# Patient Record
Sex: MALE | Race: WHITE
[De-identification: names, ages, dates, MRNs, and addresses within clinical notes are randomized per-mention and may not be internally consistent; named-entity substitution may affect disease eponyms.]

---

## 2018-09-27 ENCOUNTER — HOSPITAL ENCOUNTER (INPATIENT)
Dept: HOSPITAL 91 - E/R | Age: 68
LOS: 2 days | Discharge: HOME | DRG: 281 | End: 2018-09-29
Payer: MEDICARE

## 2018-09-27 ENCOUNTER — HOSPITAL ENCOUNTER (INPATIENT)
Age: 68
LOS: 2 days | Discharge: HOME | DRG: 281 | End: 2018-09-29

## 2018-09-27 DIAGNOSIS — F41.9: ICD-10-CM

## 2018-09-27 DIAGNOSIS — D69.6: ICD-10-CM

## 2018-09-27 DIAGNOSIS — M19.90: ICD-10-CM

## 2018-09-27 DIAGNOSIS — Z95.5: ICD-10-CM

## 2018-09-27 DIAGNOSIS — K59.09: ICD-10-CM

## 2018-09-27 DIAGNOSIS — K21.9: ICD-10-CM

## 2018-09-27 DIAGNOSIS — E11.9: ICD-10-CM

## 2018-09-27 DIAGNOSIS — I50.30: ICD-10-CM

## 2018-09-27 DIAGNOSIS — Z82.49: ICD-10-CM

## 2018-09-27 DIAGNOSIS — F32.9: ICD-10-CM

## 2018-09-27 DIAGNOSIS — E78.1: ICD-10-CM

## 2018-09-27 DIAGNOSIS — Z88.2: ICD-10-CM

## 2018-09-27 DIAGNOSIS — I48.0: ICD-10-CM

## 2018-09-27 DIAGNOSIS — I21.A1: Primary | ICD-10-CM

## 2018-09-27 DIAGNOSIS — I25.10: ICD-10-CM

## 2018-09-27 DIAGNOSIS — R65.10: ICD-10-CM

## 2018-09-27 LAB
ABNORMAL IP MESSAGE: 1
ADD MAN DIFF?: NO
ADD MAN DIFF?: NO
ANION GAP: 18 (ref 8–16)
B-TYPE NATRIURETIC PEPTIDE: 66 PG/ML (ref 0–125)
BASOPHIL #: 0 10^3/UL (ref 0–0.1)
BASOPHIL #: 0.1 10^3/UL (ref 0–0.1)
BASOPHILS %: 0.4 % (ref 0–2)
BASOPHILS %: 0.5 % (ref 0–2)
BLOOD UREA NITROGEN: 17 MG/DL (ref 7–20)
CALCIUM: 9.9 MG/DL (ref 8.4–10.2)
CARBON DIOXIDE: 19 MMOL/L (ref 21–31)
CHLORIDE: 104 MMOL/L (ref 97–110)
CK INDEX: 5.4
CK INDEX: 5.4
CK-MB: 2.49 NG/ML (ref 0–2.4)
CK-MB: 2.64 NG/ML (ref 0–2.4)
CREATINE KINASE: 46 IU/L (ref 23–200)
CREATINE KINASE: 49 IU/L (ref 23–200)
CREATININE: 0.82 MG/DL (ref 0.61–1.24)
EOSINOPHILS #: 0.1 10^3/UL (ref 0–0.5)
EOSINOPHILS #: 0.1 10^3/UL (ref 0–0.5)
EOSINOPHILS %: 0.7 % (ref 0–7)
EOSINOPHILS %: 0.8 % (ref 0–7)
GLUCOSE: 194 MG/DL (ref 70–220)
HEMATOCRIT: 37.3 % (ref 42–52)
HEMATOCRIT: 41.9 % (ref 42–52)
HEMOGLOBIN: 12.9 G/DL (ref 14–18)
HEMOGLOBIN: 15 G/DL (ref 14–18)
IMMATURE GRANS #M: 0.1 10^3/UL (ref 0–0.03)
IMMATURE GRANS #M: 0.2 10^3/UL (ref 0–0.03)
IMMATURE GRANS % (M): 1.2 % (ref 0–0.43)
IMMATURE GRANS % (M): 1.3 % (ref 0–0.43)
INR: 1.03
LYMPHOCYTES #: 2.5 10^3/UL (ref 0.8–2.9)
LYMPHOCYTES #: 6.4 10^3/UL (ref 0.8–2.9)
LYMPHOCYTES %: 29 % (ref 15–51)
LYMPHOCYTES %: 42.4 % (ref 15–51)
MEAN CORPUSCULAR HEMOGLOBIN: 30.4 PG (ref 29–33)
MEAN CORPUSCULAR HEMOGLOBIN: 30.6 PG (ref 29–33)
MEAN CORPUSCULAR HGB CONC: 34.6 G/DL (ref 32–37)
MEAN CORPUSCULAR HGB CONC: 35.8 G/DL (ref 32–37)
MEAN CORPUSCULAR VOLUME: 84.8 FL (ref 82–101)
MEAN CORPUSCULAR VOLUME: 88.4 FL (ref 82–101)
MEAN PLATELET VOLUME: 10.6 FL (ref 7.4–10.4)
MEAN PLATELET VOLUME: 11.5 FL (ref 7.4–10.4)
MONOCYTE #: 0.7 10^3/UL (ref 0.3–0.9)
MONOCYTE #: 1.2 10^3/UL (ref 0.3–0.9)
MONOCYTES %: 7.9 % (ref 0–11)
MONOCYTES %: 8.2 % (ref 0–11)
NEUTROPHIL #: 5.2 10^3/UL (ref 1.6–7.5)
NEUTROPHIL #: 7.1 10^3/UL (ref 1.6–7.5)
NEUTROPHILS %: 47.3 % (ref 39–77)
NEUTROPHILS %: 60.3 % (ref 39–77)
NUCLEATED RED BLOOD CELLS #: 0 10^3/UL (ref 0–0)
NUCLEATED RED BLOOD CELLS #: 0 10^3/UL (ref 0–0)
NUCLEATED RED BLOOD CELLS%: 0 /100WBC (ref 0–0)
NUCLEATED RED BLOOD CELLS%: 0 /100WBC (ref 0–0)
PARTIAL THROMBOPLASTIN TIME: 23.4 SEC (ref 23–35)
PLATELET COUNT: 117 10^3/UL (ref 140–415)
PLATELET COUNT: 130 10^3/UL (ref 140–415)
POSITIVE DIFF: (no result)
POTASSIUM: 4.1 MMOL/L (ref 3.5–5.1)
PROTIME: 13.6 SEC (ref 11.9–14.9)
PT RATIO: 1.1
RED BLOOD COUNT: 4.22 10^6/UL (ref 4.7–6.1)
RED BLOOD COUNT: 4.94 10^6/UL (ref 4.7–6.1)
RED CELL DISTRIBUTION WIDTH: 12.5 % (ref 11.5–14.5)
RED CELL DISTRIBUTION WIDTH: 12.6 % (ref 11.5–14.5)
SODIUM: 137 MMOL/L (ref 135–144)
TROPONIN-I: 0.02 NG/ML (ref 0–0.12)
TROPONIN-I: 0.23 NG/ML (ref 0–0.12)
TROPONIN-I: 0.45 NG/ML (ref 0–0.12)
WHITE BLOOD COUNT: 15 10^3/UL (ref 4.8–10.8)
WHITE BLOOD COUNT: 8.6 10^3/UL (ref 4.8–10.8)

## 2018-09-27 PROCEDURE — 80053 COMPREHEN METABOLIC PANEL: CPT

## 2018-09-27 PROCEDURE — 81001 URINALYSIS AUTO W/SCOPE: CPT

## 2018-09-27 PROCEDURE — 93458 L HRT ARTERY/VENTRICLE ANGIO: CPT

## 2018-09-27 PROCEDURE — 83735 ASSAY OF MAGNESIUM: CPT

## 2018-09-27 PROCEDURE — 80048 BASIC METABOLIC PNL TOTAL CA: CPT

## 2018-09-27 PROCEDURE — 80061 LIPID PANEL: CPT

## 2018-09-27 PROCEDURE — 80164 ASSAY DIPROPYLACETIC ACD TOT: CPT

## 2018-09-27 PROCEDURE — 82553 CREATINE MB FRACTION: CPT

## 2018-09-27 PROCEDURE — 99285 EMERGENCY DEPT VISIT HI MDM: CPT

## 2018-09-27 PROCEDURE — 85610 PROTHROMBIN TIME: CPT

## 2018-09-27 PROCEDURE — 71045 X-RAY EXAM CHEST 1 VIEW: CPT

## 2018-09-27 PROCEDURE — 84443 ASSAY THYROID STIM HORMONE: CPT

## 2018-09-27 PROCEDURE — 96375 TX/PRO/DX INJ NEW DRUG ADDON: CPT

## 2018-09-27 PROCEDURE — 84484 ASSAY OF TROPONIN QUANT: CPT

## 2018-09-27 PROCEDURE — 82550 ASSAY OF CK (CPK): CPT

## 2018-09-27 PROCEDURE — 82962 GLUCOSE BLOOD TEST: CPT

## 2018-09-27 PROCEDURE — 83880 ASSAY OF NATRIURETIC PEPTIDE: CPT

## 2018-09-27 PROCEDURE — 85025 COMPLETE CBC W/AUTO DIFF WBC: CPT

## 2018-09-27 PROCEDURE — 93005 ELECTROCARDIOGRAM TRACING: CPT

## 2018-09-27 PROCEDURE — 96374 THER/PROPH/DIAG INJ IV PUSH: CPT

## 2018-09-27 PROCEDURE — 36415 COLL VENOUS BLD VENIPUNCTURE: CPT

## 2018-09-27 PROCEDURE — 85730 THROMBOPLASTIN TIME PARTIAL: CPT

## 2018-09-27 PROCEDURE — 83036 HEMOGLOBIN GLYCOSYLATED A1C: CPT

## 2018-09-27 PROCEDURE — 93306 TTE W/DOPPLER COMPLETE: CPT

## 2018-09-27 RX ADMIN — DILTIAZEM HYDROCHLORIDE 1 MG: 5 INJECTION INTRAVENOUS at 05:01

## 2018-09-27 RX ADMIN — ASPIRIN 81 MG CHEWABLE TABLET 1 MG: 81 TABLET CHEWABLE at 09:31

## 2018-09-27 RX ADMIN — ENOXAPARIN SODIUM 1 MG: 100 INJECTION SUBCUTANEOUS at 09:36

## 2018-09-27 RX ADMIN — DILTIAZEM HCL 125 MG/125ML IN DEXTROSE 5% IV SOLN (1 MG/ML) 1 MLS/HR: 125-5/125 SOLUTION at 05:50

## 2018-09-27 RX ADMIN — MORPHINE SULFATE 1 MG: 2 INJECTION, SOLUTION INTRAMUSCULAR; INTRAVENOUS at 20:42

## 2018-09-27 RX ADMIN — INSULIN DETEMIR 1 UNITS: 100 INJECTION, SOLUTION SUBCUTANEOUS at 21:11

## 2018-09-27 RX ADMIN — INSULIN ASPART 1 UNIT: 100 INJECTION, SOLUTION INTRAVENOUS; SUBCUTANEOUS at 09:41

## 2018-09-27 RX ADMIN — INSULIN ASPART 1 UNIT: 100 INJECTION, SOLUTION INTRAVENOUS; SUBCUTANEOUS at 17:52

## 2018-09-27 RX ADMIN — ASPIRIN 81 MG CHEWABLE TABLET 1 MG: 81 TABLET CHEWABLE at 05:00

## 2018-09-27 RX ADMIN — KETOROLAC TROMETHAMINE 1 MG: 30 INJECTION, SOLUTION INTRAMUSCULAR at 05:50

## 2018-09-27 RX ADMIN — INSULIN ASPART 1 UNIT: 100 INJECTION, SOLUTION INTRAVENOUS; SUBCUTANEOUS at 21:11

## 2018-09-27 RX ADMIN — METOPROLOL TARTRATE 1 MG: 5 INJECTION, SOLUTION INTRAVENOUS at 05:50

## 2018-09-27 RX ADMIN — INSULIN ASPART 1 UNIT: 100 INJECTION, SOLUTION INTRAVENOUS; SUBCUTANEOUS at 12:20

## 2018-09-27 RX ADMIN — ENOXAPARIN SODIUM 1 MG: 100 INJECTION SUBCUTANEOUS at 21:11

## 2018-09-27 RX ADMIN — ACETAMINOPHEN 1 MG: 325 TABLET, FILM COATED ORAL at 14:52

## 2018-09-27 RX ADMIN — NITROGLYCERIN 1 TAB: 0.4 TABLET, ORALLY DISINTEGRATING SUBLINGUAL at 20:24

## 2018-09-27 RX ADMIN — ENOXAPARIN SODIUM 1 MG: 100 INJECTION SUBCUTANEOUS at 15:35

## 2018-09-28 LAB
ADD MAN DIFF?: NO
ALANINE AMINOTRANSFERASE: 31 IU/L (ref 13–69)
ALBUMIN/GLOBULIN RATIO: 1.24
ALBUMIN: 3.1 G/DL (ref 3.3–4.9)
ALKALINE PHOSPHATASE: 37 IU/L (ref 42–121)
ANION GAP: 11 (ref 8–16)
ASPARTATE AMINO TRANSFERASE: 12 IU/L (ref 15–46)
BASOPHIL #: 0 10^3/UL (ref 0–0.1)
BASOPHILS %: 0.4 % (ref 0–2)
BILIRUBIN,DIRECT: 0 MG/DL (ref 0–0.2)
BILIRUBIN,TOTAL: 0.5 MG/DL (ref 0.2–1.3)
BLOOD UREA NITROGEN: 19 MG/DL (ref 7–20)
CALCIUM: 9.7 MG/DL (ref 8.4–10.2)
CARBON DIOXIDE: 27 MMOL/L (ref 21–31)
CHLORIDE: 104 MMOL/L (ref 97–110)
CHOL/HDL RATIO: 3.1 RATIO
CHOLESTEROL: 106 MG/DL (ref 100–200)
CK INDEX: 3.8
CK-MB: 0.94 NG/ML (ref 0–2.4)
CREATINE KINASE: 25 IU/L (ref 23–200)
CREATININE: 0.58 MG/DL (ref 0.61–1.24)
EOSINOPHILS #: 0.1 10^3/UL (ref 0–0.5)
EOSINOPHILS %: 1.1 % (ref 0–7)
GLOBULIN: 2.5 G/DL (ref 1.3–3.2)
GLUCOSE: 157 MG/DL (ref 70–220)
HDL CHOLESTEROL: 34 MG/DL (ref 30–78)
HEMATOCRIT: 39.4 % (ref 42–52)
HEMOGLOBIN A1C: 9.2 % (ref 0–5.9)
HEMOGLOBIN: 13.6 G/DL (ref 14–18)
IMMATURE GRANS #M: 0.05 10^3/UL (ref 0–0.03)
IMMATURE GRANS % (M): 0.6 % (ref 0–0.43)
LDL CHOLESTEROL,CALCULATED: 12 MG/DL
LYMPHOCYTES #: 2.2 10^3/UL (ref 0.8–2.9)
LYMPHOCYTES %: 27.9 % (ref 15–51)
MAGNESIUM: 1.7 MG/DL (ref 1.7–2.5)
MEAN CORPUSCULAR HEMOGLOBIN: 30.4 PG (ref 29–33)
MEAN CORPUSCULAR HGB CONC: 34.5 G/DL (ref 32–37)
MEAN CORPUSCULAR VOLUME: 88.1 FL (ref 82–101)
MEAN PLATELET VOLUME: 10.1 FL (ref 7.4–10.4)
MONOCYTE #: 0.8 10^3/UL (ref 0.3–0.9)
MONOCYTES %: 9.6 % (ref 0–11)
NEUTROPHIL #: 4.7 10^3/UL (ref 1.6–7.5)
NEUTROPHILS %: 60.4 % (ref 39–77)
NUCLEATED RED BLOOD CELLS #: 0 10^3/UL (ref 0–0)
NUCLEATED RED BLOOD CELLS%: 0 /100WBC (ref 0–0)
PLATELET COUNT: 133 10^3/UL (ref 140–415)
POTASSIUM: 3.8 MMOL/L (ref 3.5–5.1)
RED BLOOD COUNT: 4.47 10^6/UL (ref 4.7–6.1)
RED CELL DISTRIBUTION WIDTH: 13.1 % (ref 11.5–14.5)
SODIUM: 138 MMOL/L (ref 135–144)
THYROID STIMULATING HORMONE: 4 MIU/L (ref 0.47–4.68)
TOTAL PROTEIN: 5.6 G/DL (ref 6.1–8.1)
TRIGLYCERIDES: 301 MG/DL (ref 0–149)
TROPONIN-I: 0.33 NG/ML (ref 0–0.12)
TROPONIN-I: 0.35 NG/ML (ref 0–0.12)
VALPROATE: 10 UG/ML (ref 50–100)
WHITE BLOOD COUNT: 7.9 10^3/UL (ref 4.8–10.8)

## 2018-09-28 PROCEDURE — B215YZZ FLUOROSCOPY OF LEFT HEART USING OTHER CONTRAST: ICD-10-PCS

## 2018-09-28 PROCEDURE — B211YZZ FLUOROSCOPY OF MULTIPLE CORONARY ARTERIES USING OTHER CONTRAST: ICD-10-PCS

## 2018-09-28 PROCEDURE — 4A023N7 MEASUREMENT OF CARDIAC SAMPLING AND PRESSURE, LEFT HEART, PERCUTANEOUS APPROACH: ICD-10-PCS

## 2018-09-28 RX ADMIN — PANTOPRAZOLE SODIUM 1 MG: 40 TABLET, DELAYED RELEASE ORAL at 06:00

## 2018-09-28 RX ADMIN — DIVALPROEX SODIUM 1 MG: 250 TABLET, EXTENDED RELEASE ORAL at 15:00

## 2018-09-28 RX ADMIN — ENOXAPARIN SODIUM 1 MG: 100 INJECTION SUBCUTANEOUS at 07:27

## 2018-09-28 RX ADMIN — POLYETHYLENE GLYCOL 3350 1 GM: 17 POWDER, FOR SOLUTION ORAL at 13:00

## 2018-09-28 RX ADMIN — BENAZEPRIL HYDROCHLORIDE 1 MG: 10 TABLET, FILM COATED ORAL at 08:32

## 2018-09-28 RX ADMIN — MORPHINE SULFATE 1 MG: 2 INJECTION, SOLUTION INTRAMUSCULAR; INTRAVENOUS at 11:19

## 2018-09-28 RX ADMIN — PANTOPRAZOLE SODIUM 1 MG: 40 TABLET, DELAYED RELEASE ORAL at 06:01

## 2018-09-28 RX ADMIN — CLOPIDOGREL 1 MG: 75 TABLET, FILM COATED ORAL at 06:39

## 2018-09-28 RX ADMIN — ASPIRIN 81 MG CHEWABLE TABLET 1 MG: 81 TABLET CHEWABLE at 07:21

## 2018-09-28 RX ADMIN — INSULIN ASPART 1 UNIT: 100 INJECTION, SOLUTION INTRAVENOUS; SUBCUTANEOUS at 07:25

## 2018-09-28 RX ADMIN — THIAMINE HYDROCHLORIDE 1 MLS/HR: 100 INJECTION, SOLUTION INTRAMUSCULAR; INTRAVENOUS at 16:09

## 2018-09-28 RX ADMIN — ASCORBIC ACID, VITAMIN A PALMITATE, CHOLECALCIFEROL, THIAMINE HYDROCHLORIDE, RIBOFLAVIN-5 PHOSPHATE SODIUM, PYRIDOXINE HYDROCHLORIDE, NIACINAMIDE, DEXPANTHENOL, ALPHA-TOCOPHEROL ACETATE, VITAMIN K1, FOLIC ACID, BIOTIN, CYANOCOBALAMIN 1 MLS/HR: 200; 3300; 200; 6; 3.6; 6; 40; 15; 10; 150; 600; 60; 5 INJECTION, SOLUTION INTRAVENOUS at 12:14

## 2018-09-28 RX ADMIN — INSULIN ASPART 1 UNIT: 100 INJECTION, SOLUTION INTRAVENOUS; SUBCUTANEOUS at 18:00

## 2018-09-28 RX ADMIN — FENOFIBRATE 1 MG: 145 TABLET, FILM COATED ORAL at 08:30

## 2018-09-28 RX ADMIN — INSULIN ASPART 1 UNIT: 100 INJECTION, SOLUTION INTRAVENOUS; SUBCUTANEOUS at 07:26

## 2018-09-28 RX ADMIN — INSULIN ASPART 1 UNIT: 100 INJECTION, SOLUTION INTRAVENOUS; SUBCUTANEOUS at 12:00

## 2018-09-28 RX ADMIN — ACETAMINOPHEN 1 MG: 325 TABLET, FILM COATED ORAL at 06:47

## 2018-09-28 RX ADMIN — ACETAMINOPHEN 1 MG: 325 TABLET, FILM COATED ORAL at 23:22

## 2018-09-28 RX ADMIN — INSULIN DETEMIR 1 UNITS: 100 INJECTION, SOLUTION SUBCUTANEOUS at 07:32

## 2018-09-28 RX ADMIN — ENOXAPARIN SODIUM 1 MG: 100 INJECTION SUBCUTANEOUS at 21:26

## 2018-09-28 RX ADMIN — ATORVASTATIN CALCIUM 1 MG: 80 TABLET, FILM COATED ORAL at 06:39

## 2018-09-28 RX ADMIN — INSULIN ASPART 1 UNIT: 100 INJECTION, SOLUTION INTRAVENOUS; SUBCUTANEOUS at 18:42

## 2018-09-28 RX ADMIN — EZETIMIBE 1 MG: 10 TABLET ORAL at 08:31

## 2018-09-28 RX ADMIN — METOPROLOL TARTRATE 1 MG: 25 TABLET ORAL at 21:04

## 2018-09-28 RX ADMIN — INSULIN ASPART 1 UNIT: 100 INJECTION, SOLUTION INTRAVENOUS; SUBCUTANEOUS at 21:25

## 2018-09-29 LAB
ADD MAN DIFF?: NO
ADD UMIC: YES
ANION GAP: 11 (ref 8–16)
BASOPHIL #: 0 10^3/UL (ref 0–0.1)
BASOPHILS %: 0.4 % (ref 0–2)
BLOOD UREA NITROGEN: 22 MG/DL (ref 7–20)
CALCIUM: 9.7 MG/DL (ref 8.4–10.2)
CARBON DIOXIDE: 28 MMOL/L (ref 21–31)
CHLORIDE: 103 MMOL/L (ref 97–110)
CREATININE: 0.72 MG/DL (ref 0.61–1.24)
EOSINOPHILS #: 0.1 10^3/UL (ref 0–0.5)
EOSINOPHILS %: 1.1 % (ref 0–7)
GLUCOSE: 146 MG/DL (ref 70–220)
HEMATOCRIT: 39.6 % (ref 42–52)
HEMOGLOBIN: 13.4 G/DL (ref 14–18)
IMMATURE GRANS #M: 0.06 10^3/UL (ref 0–0.03)
IMMATURE GRANS % (M): 0.6 % (ref 0–0.43)
LYMPHOCYTES #: 2.5 10^3/UL (ref 0.8–2.9)
LYMPHOCYTES %: 27.1 % (ref 15–51)
MAGNESIUM: 1.9 MG/DL (ref 1.7–2.5)
MEAN CORPUSCULAR HEMOGLOBIN: 30 PG (ref 29–33)
MEAN CORPUSCULAR HGB CONC: 33.8 G/DL (ref 32–37)
MEAN CORPUSCULAR VOLUME: 88.6 FL (ref 82–101)
MEAN PLATELET VOLUME: 10.2 FL (ref 7.4–10.4)
MONOCYTE #: 0.9 10^3/UL (ref 0.3–0.9)
MONOCYTES %: 9.8 % (ref 0–11)
NEUTROPHIL #: 5.6 10^3/UL (ref 1.6–7.5)
NEUTROPHILS %: 61 % (ref 39–77)
NUCLEATED RED BLOOD CELLS #: 0 10^3/UL (ref 0–0)
NUCLEATED RED BLOOD CELLS%: 0 /100WBC (ref 0–0)
PLATELET COUNT: 125 10^3/UL (ref 140–415)
POTASSIUM: 4.1 MMOL/L (ref 3.5–5.1)
RED BLOOD COUNT: 4.47 10^6/UL (ref 4.7–6.1)
RED CELL DISTRIBUTION WIDTH: 13.1 % (ref 11.5–14.5)
SODIUM: 138 MMOL/L (ref 135–144)
UR ASCORBIC ACID: NEGATIVE MG/DL
UR BACTERIA: (no result) /HPF
UR BILIRUBIN (DIP): NEGATIVE MG/DL
UR BLOOD (DIP): (no result) MG/DL
UR CLARITY: CLEAR
UR COLOR: YELLOW
UR GLUCOSE (DIP): NEGATIVE MG/DL
UR KETONES (DIP): NEGATIVE MG/DL
UR LEUKOCYTE ESTERASE (DIP): NEGATIVE LEU/UL
UR NITRITE (DIP): NEGATIVE MG/DL
UR PH (DIP): 6 (ref 5–9)
UR RBC: 0 /HPF (ref 0–5)
UR SPECIFIC GRAVITY (DIP): 1.01 (ref 1–1.03)
UR TOTAL PROTEIN (DIP): NEGATIVE MG/DL
UR UROBILINOGEN (DIP): (no result) MG/DL
UR WBC: 5 /HPF (ref 0–5)
WHITE BLOOD COUNT: 9.3 10^3/UL (ref 4.8–10.8)

## 2018-09-29 RX ADMIN — INSULIN DETEMIR 1 UNITS: 100 INJECTION, SOLUTION SUBCUTANEOUS at 08:25

## 2018-09-29 RX ADMIN — METOPROLOL TARTRATE 1 MG: 25 TABLET ORAL at 08:34

## 2018-09-29 RX ADMIN — EZETIMIBE 1 MG: 10 TABLET ORAL at 08:31

## 2018-09-29 RX ADMIN — DIVALPROEX SODIUM 1 MG: 250 TABLET, EXTENDED RELEASE ORAL at 08:31

## 2018-09-29 RX ADMIN — FENOFIBRATE 1 MG: 145 TABLET, FILM COATED ORAL at 08:31

## 2018-09-29 RX ADMIN — INSULIN ASPART 1 UNIT: 100 INJECTION, SOLUTION INTRAVENOUS; SUBCUTANEOUS at 08:26

## 2018-09-29 RX ADMIN — PANTOPRAZOLE SODIUM 1 MG: 40 TABLET, DELAYED RELEASE ORAL at 06:37

## 2018-09-29 RX ADMIN — BENAZEPRIL HYDROCHLORIDE 1 MG: 10 TABLET, FILM COATED ORAL at 08:32

## 2018-09-29 RX ADMIN — INSULIN ASPART 1 UNIT: 100 INJECTION, SOLUTION INTRAVENOUS; SUBCUTANEOUS at 08:27

## 2018-09-29 RX ADMIN — POLYETHYLENE GLYCOL 3350 1 GM: 17 POWDER, FOR SOLUTION ORAL at 08:30

## 2018-09-29 RX ADMIN — ASPIRIN 81 MG CHEWABLE TABLET 1 MG: 81 TABLET CHEWABLE at 08:31

## 2018-09-29 RX ADMIN — ENOXAPARIN SODIUM 1 MG: 100 INJECTION SUBCUTANEOUS at 08:25

## 2019-12-26 ENCOUNTER — OFFICE (OUTPATIENT)
Dept: URBAN - METROPOLITAN AREA CLINIC 45 | Facility: CLINIC | Age: 69
End: 2019-12-26

## 2019-12-26 VITALS
DIASTOLIC BLOOD PRESSURE: 76 MMHG | SYSTOLIC BLOOD PRESSURE: 126 MMHG | HEIGHT: 67 IN | HEART RATE: 66 BPM | WEIGHT: 179 LBS

## 2019-12-26 DIAGNOSIS — R10.30 ABDOMINAL PAIN, PERIUMBILICAL AND OTHER SITES: ICD-10-CM

## 2019-12-26 DIAGNOSIS — Z86.010 PERSONAL HISTORY OF COLONIC POLYPS: ICD-10-CM

## 2019-12-26 DIAGNOSIS — K21.9 GERD: ICD-10-CM

## 2019-12-26 DIAGNOSIS — E66.3 OVERWEIGHT: ICD-10-CM

## 2019-12-26 DIAGNOSIS — I48.91: ICD-10-CM

## 2019-12-26 PROCEDURE — 99205 OFFICE O/P NEW HI 60 MIN: CPT | Performed by: INTERNAL MEDICINE

## 2019-12-26 NOTE — SERVICEHPINOTES
Patient presents for evaluation of recent worsening of diffuse abdominal pain and constipation. He has long-standing history of constipation and probable IBS. Pain has been bothering him more than usual over the last several weeks. It is not clearly associated with food intake, change in body position or physical exercise. His pain occasionally improved after a bowel movement. His bowel pattern sometimes alternates between loose stools and constipation. He denies hematochezia, melena, weight loss, fever or chills. He previously failed attempts to control his constipation with MiraLax and prescription Amitiza, Linzess. He has been using outpatient Dulcolax combined with high-fiber diet. Patient has a history of stent of coronary artery.He is on multiple medicines for her GERD, diabetes mellitus, hypertension and warfarin for A. fib.

## 2019-12-27 LAB
CBC (H/H, RBC, INDICES, WBC, PLT): HEMATOCRIT: 38.3 % — LOW (ref 38.5–50)
CBC (H/H, RBC, INDICES, WBC, PLT): HEMOGLOBIN: 13.1 G/DL — LOW (ref 13.2–17.1)
CBC (H/H, RBC, INDICES, WBC, PLT): MCH: 28.7 PG (ref 27–33)
CBC (H/H, RBC, INDICES, WBC, PLT): MCHC: 34.2 G/DL (ref 32–36)
CBC (H/H, RBC, INDICES, WBC, PLT): MCV: 83.8 FL (ref 80–100)
CBC (H/H, RBC, INDICES, WBC, PLT): MPV: 10.1 FL (ref 7.5–12.5)
CBC (H/H, RBC, INDICES, WBC, PLT): PLATELET COUNT: 157 THOUSAND/UL (ref 140–400)
CBC (H/H, RBC, INDICES, WBC, PLT): RDW: 13.9 % (ref 11–15)
CBC (H/H, RBC, INDICES, WBC, PLT): RED BLOOD CELL COUNT: 4.57 MILLION/UL (ref 4.2–5.8)
CBC (H/H, RBC, INDICES, WBC, PLT): WHITE BLOOD CELL COUNT: 6.8 THOUSAND/UL (ref 3.8–10.8)
COMPREHENSIVE METABOLIC PANEL: ALBUMIN/GLOBULIN RATIO: 1.9 (CALC) (ref 1–2.5)
COMPREHENSIVE METABOLIC PANEL: ALBUMIN: 4 G/DL (ref 3.6–5.1)
COMPREHENSIVE METABOLIC PANEL: ALKALINE PHOSPHATASE: 50 U/L (ref 40–115)
COMPREHENSIVE METABOLIC PANEL: ALT: 16 U/L (ref 9–46)
COMPREHENSIVE METABOLIC PANEL: AST: 12 U/L (ref 10–35)
COMPREHENSIVE METABOLIC PANEL: BILIRUBIN, TOTAL: 0.3 MG/DL (ref 0.2–1.2)
COMPREHENSIVE METABOLIC PANEL: BUN/CREATININE RATIO: (no result) (CALC)
COMPREHENSIVE METABOLIC PANEL: CALCIUM: 8.8 MG/DL (ref 8.6–10.3)
COMPREHENSIVE METABOLIC PANEL: CARBON DIOXIDE: 26 MMOL/L (ref 20–32)
COMPREHENSIVE METABOLIC PANEL: CHLORIDE: 98 MMOL/L (ref 98–110)
COMPREHENSIVE METABOLIC PANEL: CREATININE: 0.71 MG/DL (ref 0.7–1.25)
COMPREHENSIVE METABOLIC PANEL: EGFR AFRICAN AMERICAN: 111 ML/MIN/1.73M2 (ref 60–?)
COMPREHENSIVE METABOLIC PANEL: EGFR NON-AFR. AMERICAN: 96 ML/MIN/1.73M2 (ref 60–?)
COMPREHENSIVE METABOLIC PANEL: GLOBULIN: 2.1 G/DL (CALC) (ref 1.9–3.7)
COMPREHENSIVE METABOLIC PANEL: GLUCOSE: 235 MG/DL — HIGH (ref 65–139)
COMPREHENSIVE METABOLIC PANEL: POTASSIUM: 4.5 MMOL/L (ref 3.5–5.3)
COMPREHENSIVE METABOLIC PANEL: PROTEIN, TOTAL: 6.1 G/DL (ref 6.1–8.1)
COMPREHENSIVE METABOLIC PANEL: SODIUM: 132 MMOL/L — LOW (ref 135–146)
COMPREHENSIVE METABOLIC PANEL: UREA NITROGEN (BUN): 15 MG/DL (ref 7–25)
FERRITIN: 33 NG/ML (ref 24–380)
IRON AND TOTAL IRON BINDING CAPACITY: % SATURATION: 14 % (CALC) — LOW (ref 20–48)
IRON AND TOTAL IRON BINDING CAPACITY: IRON BINDING CAPACITY: 424 MCG/DL (CALC) (ref 250–425)
IRON AND TOTAL IRON BINDING CAPACITY: IRON, TOTAL: 60 MCG/DL (ref 50–180)

## 2020-01-08 ENCOUNTER — OFFICE (OUTPATIENT)
Dept: URBAN - METROPOLITAN AREA CLINIC 45 | Facility: CLINIC | Age: 70
End: 2020-01-08

## 2020-01-08 VITALS
HEART RATE: 66 BPM | SYSTOLIC BLOOD PRESSURE: 132 MMHG | WEIGHT: 180 LBS | HEIGHT: 67 IN | DIASTOLIC BLOOD PRESSURE: 80 MMHG

## 2020-01-08 DIAGNOSIS — K76.0 FATTY (CHANGE OF) LIVER, NOT ELSEWHERE CLASSIFIED: ICD-10-CM

## 2020-01-08 DIAGNOSIS — Z86.010 PERSONAL HISTORY OF COLONIC POLYPS: ICD-10-CM

## 2020-01-08 DIAGNOSIS — I48.91: ICD-10-CM

## 2020-01-08 DIAGNOSIS — R10.30 ABDOMINAL PAIN, PERIUMBILICAL AND OTHER SITES: ICD-10-CM

## 2020-01-08 DIAGNOSIS — E66.3 OVERWEIGHT: ICD-10-CM

## 2020-01-08 DIAGNOSIS — K21.9 GERD: ICD-10-CM

## 2020-01-08 PROCEDURE — 99215 OFFICE O/P EST HI 40 MIN: CPT | Performed by: INTERNAL MEDICINE

## 2020-01-08 NOTE — SERVICEHPINOTES
Patient returns after CT scan of the abdomen done to evaluate prolonged and nonspecific abdominal pain and altered bowel habits with predominance of constipation. CT scan of the abdomen demonstrated no obvious gastrointestinal abnormalities, except trace amount of fluid in abdomen, left kidney stone and suggestion of hepatic steatosis. His bowel pattern sometimes alternates between loose stools and constipation. He continues to deny having hematochezia, melena, weight loss, fever or chills.  Patient was given samples of Trulance in attempts to control his abdominal pain and constipation, but hasn't started it yet. He previously wasn't satisfied with trials of MiraLax, was reportedly intolerant to prescription Amitiza, and noticed no effect from use of Linzess. He has been using outpatient Dulcolax combined with high-fiber diet.

## 2020-03-04 ENCOUNTER — OFFICE (OUTPATIENT)
Dept: URBAN - METROPOLITAN AREA CLINIC 45 | Facility: CLINIC | Age: 70
End: 2020-03-04

## 2020-03-04 VITALS — HEIGHT: 67 IN

## 2020-03-04 DIAGNOSIS — K76.0 FATTY (CHANGE OF) LIVER, NOT ELSEWHERE CLASSIFIED: ICD-10-CM

## 2020-03-04 PROCEDURE — 91200 LIVER ELASTOGRAPHY: CPT | Performed by: INTERNAL MEDICINE

## 2020-03-10 ENCOUNTER — OFFICE (OUTPATIENT)
Dept: URBAN - METROPOLITAN AREA CLINIC 45 | Facility: CLINIC | Age: 70
End: 2020-03-10

## 2020-03-10 VITALS
DIASTOLIC BLOOD PRESSURE: 82 MMHG | HEIGHT: 67 IN | SYSTOLIC BLOOD PRESSURE: 134 MMHG | HEART RATE: 71 BPM | WEIGHT: 182 LBS

## 2020-03-10 DIAGNOSIS — K21.9 GERD: ICD-10-CM

## 2020-03-10 DIAGNOSIS — Z86.010 PERSONAL HISTORY OF COLONIC POLYPS: ICD-10-CM

## 2020-03-10 DIAGNOSIS — K76.0 STEATOSIS OF LIVER: ICD-10-CM

## 2020-03-10 DIAGNOSIS — I48.91: ICD-10-CM

## 2020-03-10 PROCEDURE — 99214 OFFICE O/P EST MOD 30 MIN: CPT | Performed by: INTERNAL MEDICINE

## 2020-03-10 NOTE — SERVICEHPINOTES
Patient returns after fibroscan done to evaluate steatosis of the liver suggested by prior abdominal imaging.  It demonstrated evidence of severe hepatic steatosis, but no significant fibrosis was detected. His bowel pattern has been unchanged with predominance of constipation, but he didn't find recent trial of Trulance helpful as it seemed to have lost its efficacy after 1 week. He previously wasn't satisfied with trials of MiraLax, was reportedly intolerant to prescription Amitiza, and noticed no effect from use of Linzess. He has returned to using outpatient Dulcolax combined with high-fiber diet.

## 2022-04-05 ENCOUNTER — OFFICE (OUTPATIENT)
Dept: URBAN - METROPOLITAN AREA CLINIC 45 | Facility: CLINIC | Age: 72
End: 2022-04-05

## 2022-04-05 VITALS — WEIGHT: 165 LBS | HEIGHT: 67 IN

## 2022-04-05 DIAGNOSIS — R15.2 DEFECATION URGENCY: ICD-10-CM

## 2022-04-05 DIAGNOSIS — I48.91: ICD-10-CM

## 2022-04-05 DIAGNOSIS — Z95.5 STENTED CORONARY ARTERY: ICD-10-CM

## 2022-04-05 DIAGNOSIS — Z86.010 PERSONAL HISTORY OF COLONIC POLYPS: ICD-10-CM

## 2022-04-05 DIAGNOSIS — K76.0 STEATOSIS OF LIVER: ICD-10-CM

## 2022-04-05 DIAGNOSIS — G20 PARKINSON DISEASE: ICD-10-CM

## 2022-04-05 PROCEDURE — 99214 OFFICE O/P EST MOD 30 MIN: CPT | Mod: 95 | Performed by: INTERNAL MEDICINE

## 2022-04-05 NOTE — SERVICEHPINOTES
Patient returns for telemedicine follow-up office alteration of bowel habits. Patient has had long-standing constipation, which at time alternates with runs of loose stools. He has lately been experiencing several episodes of bowel urgency and fecal incontinence.  Patient was previously seen in March 2022 due to steatosis of the liver suggested by prior abdominal imaging. Fibroscan performed at the time demonstrated evidence of significant hepatic steatosis, but no significant fibrosis was detected. Patient didn't find recent trial of Trulance helpful as it seemed to have lost its efficacy after 1 week. He previously wasn't satisfied with trials of MiraLax, was reportedly intolerant to prescription Amitiza, and noticed no effect from use of Linzess. He has returned to using outpatient Dulcolax combined with high-fiber diet.

## 2022-04-29 ENCOUNTER — OFFICE (OUTPATIENT)
Dept: URBAN - METROPOLITAN AREA CLINIC 45 | Facility: CLINIC | Age: 72
End: 2022-04-29

## 2022-04-29 VITALS
SYSTOLIC BLOOD PRESSURE: 134 MMHG | DIASTOLIC BLOOD PRESSURE: 86 MMHG | HEART RATE: 55 BPM | HEIGHT: 67 IN | WEIGHT: 167 LBS

## 2022-04-29 DIAGNOSIS — G20 PARKINSON DISEASE: ICD-10-CM

## 2022-04-29 DIAGNOSIS — Z95.5 STENTED CORONARY ARTERY: ICD-10-CM

## 2022-04-29 DIAGNOSIS — D68.32 WARFARIN-INDUCED COAGULOPATHY: ICD-10-CM

## 2022-04-29 DIAGNOSIS — R10.30: ICD-10-CM

## 2022-04-29 DIAGNOSIS — Z86.010 PERSONAL HISTORY OF COLONIC POLYPS: ICD-10-CM

## 2022-04-29 PROCEDURE — 99215 OFFICE O/P EST HI 40 MIN: CPT | Performed by: INTERNAL MEDICINE

## 2022-04-29 NOTE — SERVICEHPINOTES
Patient returns for follow-up of abdominal pain and altered bowel habits. Patient has had long-standing constipation, which at time alternates with runs of loose stools. He has lately been experiencing more constant and dull abdominal pain independent from constipation. Patient has been lately controlling his constipation by combination of fiber, stool softener and MiraLAX.  He previously didn't find trial of Trulance helpful. He was earlier not able to tolerate Amitiza, and noticed no effect from use of Linzess. There has been a previous unremarkable colon exam performed by colorectal surgeon in the fall of 2017. The patient has no family history of colon cancer or other significant GI diseases. Patient denies nausea, vomiting, dysphagia, reflux, diarrhea, rectal bleeding, melena, and significant change in weight. Denies shortness of breath and chest pain.

## 2023-03-14 ENCOUNTER — HOSPITAL ENCOUNTER (INPATIENT)
Dept: HOSPITAL 54 - ER | Age: 73
LOS: 1 days | Discharge: HOME | DRG: 206 | End: 2023-03-15
Attending: INTERNAL MEDICINE | Admitting: NURSE PRACTITIONER
Payer: MEDICARE

## 2023-03-14 VITALS — HEIGHT: 66 IN | WEIGHT: 140 LBS | BODY MASS INDEX: 22.5 KG/M2

## 2023-03-14 VITALS — SYSTOLIC BLOOD PRESSURE: 194 MMHG | DIASTOLIC BLOOD PRESSURE: 79 MMHG

## 2023-03-14 DIAGNOSIS — F02.80: ICD-10-CM

## 2023-03-14 DIAGNOSIS — N40.0: ICD-10-CM

## 2023-03-14 DIAGNOSIS — Z95.5: ICD-10-CM

## 2023-03-14 DIAGNOSIS — Z79.82: ICD-10-CM

## 2023-03-14 DIAGNOSIS — D68.69: ICD-10-CM

## 2023-03-14 DIAGNOSIS — Z79.899: ICD-10-CM

## 2023-03-14 DIAGNOSIS — K21.9: ICD-10-CM

## 2023-03-14 DIAGNOSIS — E11.9: ICD-10-CM

## 2023-03-14 DIAGNOSIS — Z79.01: ICD-10-CM

## 2023-03-14 DIAGNOSIS — I25.10: ICD-10-CM

## 2023-03-14 DIAGNOSIS — M94.0: Primary | ICD-10-CM

## 2023-03-14 DIAGNOSIS — Z79.84: ICD-10-CM

## 2023-03-14 DIAGNOSIS — G31.83: ICD-10-CM

## 2023-03-14 DIAGNOSIS — R55: ICD-10-CM

## 2023-03-14 DIAGNOSIS — D64.9: ICD-10-CM

## 2023-03-14 DIAGNOSIS — R09.1: ICD-10-CM

## 2023-03-14 DIAGNOSIS — G20: ICD-10-CM

## 2023-03-14 DIAGNOSIS — Z88.2: ICD-10-CM

## 2023-03-14 DIAGNOSIS — I10: ICD-10-CM

## 2023-03-14 DIAGNOSIS — I48.0: ICD-10-CM

## 2023-03-14 DIAGNOSIS — E78.5: ICD-10-CM

## 2023-03-14 DIAGNOSIS — Z66: ICD-10-CM

## 2023-03-14 LAB
ALBUMIN SERPL BCP-MCNC: 3.7 G/DL (ref 3.4–5)
ALP SERPL-CCNC: 108 U/L (ref 46–116)
ALT SERPL W P-5'-P-CCNC: 27 U/L (ref 12–78)
AST SERPL W P-5'-P-CCNC: 16 U/L (ref 15–37)
BASOPHILS # BLD AUTO: 0 K/UL (ref 0–0.2)
BASOPHILS NFR BLD AUTO: 0.4 % (ref 0–2)
BILIRUB DIRECT SERPL-MCNC: 0.1 MG/DL (ref 0–0.2)
BILIRUB SERPL-MCNC: 0.4 MG/DL (ref 0.2–1)
BUN SERPL-MCNC: 13 MG/DL (ref 7–18)
CALCIUM SERPL-MCNC: 9 MG/DL (ref 8.5–10.1)
CHLORIDE SERPL-SCNC: 103 MMOL/L (ref 98–107)
CO2 SERPL-SCNC: 29 MMOL/L (ref 21–32)
CREAT SERPL-MCNC: 0.8 MG/DL (ref 0.6–1.3)
EOSINOPHIL NFR BLD AUTO: 2.6 % (ref 0–6)
GLUCOSE SERPL-MCNC: 224 MG/DL (ref 74–106)
HCT VFR BLD AUTO: 37 % (ref 39–51)
HGB BLD-MCNC: 12.7 G/DL (ref 13.5–17.5)
LYMPHOCYTES NFR BLD AUTO: 1.6 K/UL (ref 0.8–4.8)
LYMPHOCYTES NFR BLD AUTO: 30.4 % (ref 20–44)
MCHC RBC AUTO-ENTMCNC: 34 G/DL (ref 31–36)
MCV RBC AUTO: 85 FL (ref 80–96)
MONOCYTES NFR BLD AUTO: 0.4 K/UL (ref 0.1–1.3)
MONOCYTES NFR BLD AUTO: 7.8 % (ref 2–12)
NEUTROPHILS # BLD AUTO: 3.1 K/UL (ref 1.8–8.9)
NEUTROPHILS NFR BLD AUTO: 58.8 % (ref 43–81)
PLATELET # BLD AUTO: 118 K/UL (ref 150–450)
POTASSIUM SERPL-SCNC: 3.9 MMOL/L (ref 3.5–5.1)
PROT SERPL-MCNC: 6.6 G/DL (ref 6.4–8.2)
RBC # BLD AUTO: 4.43 MIL/UL (ref 4.5–6)
SODIUM SERPL-SCNC: 139 MMOL/L (ref 136–145)
WBC NRBC COR # BLD AUTO: 5.2 K/UL (ref 4.3–11)

## 2023-03-14 PROCEDURE — G0378 HOSPITAL OBSERVATION PER HR: HCPCS

## 2023-03-14 PROCEDURE — C9803 HOPD COVID-19 SPEC COLLECT: HCPCS

## 2023-03-14 NOTE — NUR
RN TELE ADMISSION NOTES



ADMITTED A 71 Y/O MALE, CAME FROM ER VIA GURNEY ACCOMPANIED WITH DAUGHTER, NURSE AND CNA AT 
2230. PATIENT IS A/O X 4, ABLE TO VERBALIZED NEEDS. PATIENT IS COOPERATIVE AND ORIENTED 
SURROUNDINGS. TRANSFER PATIENT TO BED SAFETY AND SECURED. WITH INITIAL VITAL SIGNS OF 
TEMP:97.8, HI: 81, RR:18, SpO2:99, BP:184/79. 

UPON ASSESSMENT PATIENT COMPLAIN OF CHEST PAIN 5/10 PS, NO OTHER SYMPTOMS. PATIENT IS NOT IN 
DISTRESS AT THIS TIME. ATTACHED PATIENT TO TELE MONITOR DEVICE. SKIN ASSESSMENT PERFORMED, 
NOTED SOME REDNESS AND SCRATCHES AT LOWER EXTREMITIES. PICTURE TAKEN AND RECORDED. NO EDEMA 
NOTED. BOWEL SOUNDS ARE ACTIVE. WITH IV ACCESS AT LFA #20G PATENT AND INTACT. PATIENT 
HISTORY AND LIST OF MEDICATION TAKEN AND RECORDED. INFORMED DR. MONET REGARDING ADMISSION 
WITH ORDERS MADE AND CARRIED OUT. ALL PATIENT BELONGINGS ARE ACCOUNTED AND RECORDED FOR. 
PATIENT IS ORIENTED TO THE UNIT AND HOW TO USE THE CALL LIGHT. HEAD TO TOE PHYSICAL EXAM 
DONE AND RECORDED. KEPT BED ON LOWER LOCKED POSITION. KEPT SIDE RAILS UP X 2 ALL THE TIME. 
KEPT CALL LIGHT WITHIN AT REACH. PATIENT IS ON BED REST FOR NOW USES URINAL AND DIAPER. WILL 
CONTINUE TO MONITOR.

## 2023-03-14 NOTE — NUR
JOHNSON FROM HOME FOR SHARP SUBSTERNAL CP NON RADIATING, 30 MINS  
ASPIRIN AND 1 SPRAY NITRO GIVEN BY EMS. PLACED IN BED, AAOX4, BREATHING EVEN 
AND UNLABORED SATURATING AT 98%RA, ATTACHED TO MONITOR SHOWS NORMAL SINUS 
RHYTHM IN- 74, IN PAIN 6/10 PS.

## 2023-03-15 VITALS — DIASTOLIC BLOOD PRESSURE: 51 MMHG | SYSTOLIC BLOOD PRESSURE: 94 MMHG

## 2023-03-15 VITALS — DIASTOLIC BLOOD PRESSURE: 68 MMHG | SYSTOLIC BLOOD PRESSURE: 122 MMHG

## 2023-03-15 VITALS — DIASTOLIC BLOOD PRESSURE: 57 MMHG | SYSTOLIC BLOOD PRESSURE: 105 MMHG

## 2023-03-15 VITALS — SYSTOLIC BLOOD PRESSURE: 117 MMHG | DIASTOLIC BLOOD PRESSURE: 68 MMHG

## 2023-03-15 VITALS — DIASTOLIC BLOOD PRESSURE: 75 MMHG | SYSTOLIC BLOOD PRESSURE: 184 MMHG

## 2023-03-15 LAB
BASOPHILS # BLD AUTO: 0 K/UL (ref 0–0.2)
BASOPHILS NFR BLD AUTO: 0.4 % (ref 0–2)
BUN SERPL-MCNC: 11 MG/DL (ref 7–18)
CALCIUM SERPL-MCNC: 8.9 MG/DL (ref 8.5–10.1)
CHLORIDE SERPL-SCNC: 107 MMOL/L (ref 98–107)
CHOLEST SERPL-MCNC: 89 MG/DL (ref ?–200)
CO2 SERPL-SCNC: 25 MMOL/L (ref 21–32)
CREAT SERPL-MCNC: 0.6 MG/DL (ref 0.6–1.3)
EOSINOPHIL NFR BLD AUTO: 2.6 % (ref 0–6)
FERRITIN SERPL-MCNC: 72 NG/ML (ref 8–388)
GLUCOSE SERPL-MCNC: 110 MG/DL (ref 74–106)
HCT VFR BLD AUTO: 35 % (ref 39–51)
HDLC SERPL-MCNC: 42 MG/DL (ref 40–60)
HGB BLD-MCNC: 11.9 G/DL (ref 13.5–17.5)
IRON SERPL-MCNC: 66 UG/DL (ref 50–175)
LDLC SERPL DIRECT ASSAY-MCNC: 40 MG/DL (ref 0–99)
LYMPHOCYTES NFR BLD AUTO: 2 K/UL (ref 0.8–4.8)
LYMPHOCYTES NFR BLD AUTO: 33.4 % (ref 20–44)
MAGNESIUM SERPL-MCNC: 2 MG/DL (ref 1.8–2.4)
MCHC RBC AUTO-ENTMCNC: 34 G/DL (ref 31–36)
MCV RBC AUTO: 85 FL (ref 80–96)
MONOCYTES NFR BLD AUTO: 0.5 K/UL (ref 0.1–1.3)
MONOCYTES NFR BLD AUTO: 8.2 % (ref 2–12)
NEUTROPHILS # BLD AUTO: 3.3 K/UL (ref 1.8–8.9)
NEUTROPHILS NFR BLD AUTO: 55.4 % (ref 43–81)
PHOSPHATE SERPL-MCNC: 4.1 MG/DL (ref 2.5–4.9)
PLATELET # BLD AUTO: 101 K/UL (ref 150–450)
POTASSIUM SERPL-SCNC: 4 MMOL/L (ref 3.5–5.1)
RBC # BLD AUTO: 4.15 MIL/UL (ref 4.5–6)
SODIUM SERPL-SCNC: 140 MMOL/L (ref 136–145)
TIBC SERPL-MCNC: 274 UG/DL (ref 250–450)
TRIGL SERPL-MCNC: 99 MG/DL (ref 30–150)
TSH SERPL DL<=0.005 MIU/L-ACNC: 3.34 UIU/ML (ref 0.36–3.74)
WBC NRBC COR # BLD AUTO: 6 K/UL (ref 4.3–11)

## 2023-03-15 RX ADMIN — NITROGLYCERIN SCH GM: 20 OINTMENT TOPICAL at 00:30

## 2023-03-15 RX ADMIN — NITROGLYCERIN SCH GM: 20 OINTMENT TOPICAL at 08:30

## 2023-03-15 RX ADMIN — APIXABAN SCH MG: 5 TABLET, FILM COATED ORAL at 00:01

## 2023-03-15 RX ADMIN — APIXABAN SCH MG: 5 TABLET, FILM COATED ORAL at 08:31

## 2023-03-15 NOTE — NUR
RN ORTHOSTATIC BLOOD PRESSURE 



LYING ON BED: 122/68



SITTIN/61



STANDIN/51 



WILL CONTINUE TO MONITOR

## 2023-03-15 NOTE — NUR
RN DISCHARGE NOTE

PATIENT DISCHARGE IN STABLE MEDICAL CONDITION. A/OX4. VS TAKEN, STABLE AND RECORDED. NO IV 
ACCESS. NAME ARM BAND REMOVED. EXTERNAL CARDIAC MONITOR REMOVED AND RETURNED TO TELE DESK. 
SKIN ASSESSMENT DONE, PT REFUSED PICTURES, STATED THEY TOOK PICTURES YESTERDAY. ALL 
BELONGINGS LIST SIGNED. HEALTH TEACHING AND DISCHARGE INSTRUCTION GIVEN AND VERBALIZED 
UNDERSTANDING. DISCUSSES PRESCRIPTIONS WITH PATIENT. HIS WIFE BY BEDSIDE, BOTH VERBALIZED 
UNDERSTANDING. INSTRUCTED PATIENT IN CASE OF EMERGENCY TO CALL 911 OR GO TO THE NEAREST ER. 
PATIENT LEFT UNIT VIA WHEELCHAIR WITH NO SIGN OF DISTRESS, ACCOMPANIED BY CNA. LEFT WITH HIS 
SPOUSE. CHARGE NURSE AWARE OF DISCHARGE.

## 2023-03-15 NOTE — NUR
RN NOTES



NOTED LOW BLOOD PRESSURE OF 95/55.NO S/S OF DIZZINESS OR CHEST PAIN AT THIS TIME.  PUT LOWER 
EXTREMITIES ELEVATED HOOKED TO OXYGEN SUPPORT AT 2LPM VIA NASAL CANNULA. WILL CONTINUE TO 
MONITOR.

## 2023-03-15 NOTE — NUR
RN TELE CLOSING NOTES



PATIENT IS IN BED, A/O X 4. AWAKE AND COHERENT ABLE TO VERBALIZED CONCERNS. ON HIGH BACK 
REST POSITION, NO S/S OF CHEST PAIN OR ANY DISCOMFORT AT THIS TIME. HOOKED TO OXYGEN VIA 
NASAL CANNULA AT 2LPM SATURATING WELL.  WITH IV ACCESS AT LEFT FA #20G PATENT AND INTACT. 
CONTINENT PATIENT USES URINAL AND WITH BEDSIDE COMMODE AVAILABLE. PATENT AND INTACT. ALL DUE 
MEDICATIONS GIVEN ALL NEEDS MET. KEPT PATIENT WARM AND COMFORTABLE. KEPT BED ON LOWER LOCK 
POSITION. KEPT SIDE RAILS UP ALL THE TIME. KEPT CALL LIGHT WITHIN AT REACH. WILL ENDORSED TO 
AM SHIFT FOR TIFFANIE.

## 2023-03-15 NOTE — NUR
RN TELE OPENING NOTE

RECEIVED PATIENT IS IN BED, A/O X 4. ABLE TO MAKE NEEDS KNOWN.  NO S/S OF CHEST PAIN OR ANY 
DISCOMFORT AT THIS TIME. HOOKED TO OXYGEN VIA NASAL CANNULA AT 2LPM SATURATING WELL, 
BREATHING EVEN AND NON LABORED. PT IS ON EXTERNAL CARDIAC MONITOR READING SR @76 BPM.  WITH 
IV ACCESS AT LEFT FA #20G PATENT AND INTACT, SL. SAFETY MEASURES IN PLACE: CALL LIGHT WITHIN 
REACH, SIDE RAILS UP X 2, BED LOCKED IN LOWEST POSITION, BED ALARM ON. WILL CONTINUE TO 
MONITOR.

## 2025-02-01 ENCOUNTER — HOSPITAL ENCOUNTER (INPATIENT)
Dept: HOSPITAL 54 - ER | Age: 75
LOS: 4 days | Discharge: HOME HEALTH | DRG: 194 | End: 2025-02-05
Attending: NURSE PRACTITIONER | Admitting: INTERNAL MEDICINE
Payer: MEDICARE

## 2025-02-01 VITALS — HEIGHT: 66 IN | BODY MASS INDEX: 23.78 KG/M2 | WEIGHT: 148 LBS

## 2025-02-01 VITALS — OXYGEN SATURATION: 99 % | TEMPERATURE: 99 F | SYSTOLIC BLOOD PRESSURE: 150 MMHG | DIASTOLIC BLOOD PRESSURE: 63 MMHG

## 2025-02-01 VITALS — DIASTOLIC BLOOD PRESSURE: 54 MMHG | OXYGEN SATURATION: 97 % | SYSTOLIC BLOOD PRESSURE: 137 MMHG | TEMPERATURE: 98.8 F

## 2025-02-01 DIAGNOSIS — R79.89: ICD-10-CM

## 2025-02-01 DIAGNOSIS — R09.02: ICD-10-CM

## 2025-02-01 DIAGNOSIS — Z98.890: ICD-10-CM

## 2025-02-01 DIAGNOSIS — Z87.39: ICD-10-CM

## 2025-02-01 DIAGNOSIS — Z79.01: ICD-10-CM

## 2025-02-01 DIAGNOSIS — Z86.59: ICD-10-CM

## 2025-02-01 DIAGNOSIS — Z87.442: ICD-10-CM

## 2025-02-01 DIAGNOSIS — J15.9: Primary | ICD-10-CM

## 2025-02-01 DIAGNOSIS — Z95.5: ICD-10-CM

## 2025-02-01 DIAGNOSIS — Z86.73: ICD-10-CM

## 2025-02-01 DIAGNOSIS — E87.1: ICD-10-CM

## 2025-02-01 DIAGNOSIS — I10: ICD-10-CM

## 2025-02-01 DIAGNOSIS — Z20.822: ICD-10-CM

## 2025-02-01 DIAGNOSIS — F02.80: ICD-10-CM

## 2025-02-01 DIAGNOSIS — E86.0: ICD-10-CM

## 2025-02-01 DIAGNOSIS — Z79.84: ICD-10-CM

## 2025-02-01 DIAGNOSIS — Z87.828: ICD-10-CM

## 2025-02-01 DIAGNOSIS — Z79.83: ICD-10-CM

## 2025-02-01 DIAGNOSIS — Z88.2: ICD-10-CM

## 2025-02-01 DIAGNOSIS — I25.10: ICD-10-CM

## 2025-02-01 DIAGNOSIS — Z96.651: ICD-10-CM

## 2025-02-01 DIAGNOSIS — I48.91: ICD-10-CM

## 2025-02-01 DIAGNOSIS — D64.9: ICD-10-CM

## 2025-02-01 DIAGNOSIS — D69.6: ICD-10-CM

## 2025-02-01 DIAGNOSIS — E44.0: ICD-10-CM

## 2025-02-01 DIAGNOSIS — E11.36: ICD-10-CM

## 2025-02-01 DIAGNOSIS — G20.A1: ICD-10-CM

## 2025-02-01 DIAGNOSIS — Z79.899: ICD-10-CM

## 2025-02-01 DIAGNOSIS — K21.9: ICD-10-CM

## 2025-02-01 LAB
ALBUMIN SERPL BCP-MCNC: 3 G/DL (ref 3.4–5)
ALP SERPL-CCNC: 80 U/L (ref 46–116)
ALT SERPL W P-5'-P-CCNC: 26 U/L (ref 12–78)
APPEARANCE UR: CLEAR
AST SERPL W P-5'-P-CCNC: 16 U/L (ref 15–37)
BASOPHILS # BLD AUTO: 0 K/UL (ref 0–0.2)
BASOPHILS NFR BLD AUTO: 0.1 % (ref 0–2)
BILIRUB DIRECT SERPL-MCNC: 0.3 MG/DL (ref 0–0.2)
BILIRUB SERPL-MCNC: 0.8 MG/DL (ref 0.2–1)
BILIRUB UR QL STRIP: NEGATIVE
BUN SERPL-MCNC: 25 MG/DL (ref 7–18)
CALCIUM SERPL-MCNC: 10.1 MG/DL (ref 8.5–10.1)
CHLORIDE SERPL-SCNC: 97 MMOL/L (ref 98–107)
CO2 SERPL-SCNC: 34 MMOL/L (ref 21–32)
COLOR UR: YELLOW
CREAT SERPL-MCNC: 1.3 MG/DL (ref 0.6–1.3)
EOSINOPHIL # BLD AUTO: 0.1 K/UL (ref 0–0.7)
EOSINOPHIL NFR BLD AUTO: 1.1 % (ref 0–6)
ERYTHROCYTE [DISTWIDTH] IN BLOOD BY AUTOMATED COUNT: 13.2 % (ref 11.5–15)
GLUCOSE SERPL-MCNC: 96 MG/DL (ref 74–106)
GLUCOSE UR STRIP-MCNC: NEGATIVE MG/DL
HCT VFR BLD AUTO: 34 % (ref 39–51)
HGB BLD-MCNC: 11.6 G/DL (ref 13.5–17.5)
HGB UR QL STRIP: NEGATIVE ERY/UL
KETONES UR STRIP-MCNC: NEGATIVE MG/DL
LACTATE SERPL-SCNC: 1.5 MMOL/L (ref 0.4–2)
LEUKOCYTE ESTERASE UR QL STRIP: NEGATIVE
LYMPHOCYTES NFR BLD AUTO: 0.7 K/UL (ref 0.8–4.8)
LYMPHOCYTES NFR BLD AUTO: 5.2 % (ref 20–44)
MCH RBC QN AUTO: 30 PG (ref 26–33)
MCHC RBC AUTO-ENTMCNC: 35 G/DL (ref 31–36)
MCV RBC AUTO: 87 FL (ref 80–96)
MONOCYTES NFR BLD AUTO: 1.4 K/UL (ref 0.1–1.3)
MONOCYTES NFR BLD AUTO: 10.4 % (ref 2–12)
NEUTROPHILS # BLD AUTO: 10.8 K/UL (ref 1.8–8.9)
NEUTROPHILS NFR BLD AUTO: 83.2 % (ref 43–81)
NITRITE UR QL STRIP: NEGATIVE
PH UR STRIP: 6 [PH] (ref 5–8)
PLATELET # BLD AUTO: 122 K/UL (ref 150–450)
POTASSIUM SERPL-SCNC: 4.1 MMOL/L (ref 3.5–5.1)
PROT SERPL-MCNC: 6.8 G/DL (ref 6.4–8.2)
PROT UR QL STRIP: NEGATIVE MG/DL
RBC # BLD AUTO: 3.87 MIL/UL (ref 4.5–6)
SODIUM SERPL-SCNC: 134 MMOL/L (ref 136–145)
SP GR UR STRIP: 1.01 (ref 1–1.03)
TSH SERPL DL<=0.005 MIU/L-ACNC: 1.14 UIU/ML (ref 0.36–3.74)
UROBILINOGEN UR STRIP-MCNC: 0.2 EU/DL
WBC NRBC COR # BLD AUTO: 13 K/UL (ref 4.3–11)

## 2025-02-01 PROCEDURE — A4223 INFUSION SUPPLIES W/O PUMP: HCPCS

## 2025-02-01 PROCEDURE — G0378 HOSPITAL OBSERVATION PER HR: HCPCS

## 2025-02-01 RX ADMIN — SODIUM CHLORIDE ONE ML: 9 INJECTION, SOLUTION INTRAVENOUS at 10:15

## 2025-02-01 RX ADMIN — FUROSEMIDE ONE MG: 10 INJECTION INTRAVENOUS at 11:45

## 2025-02-01 RX ADMIN — ATORVASTATIN CALCIUM SCH MG: 40 TABLET, FILM COATED ORAL at 18:56

## 2025-02-01 RX ADMIN — QUETIAPINE FUMARATE SCH MG: 100 TABLET ORAL at 21:43

## 2025-02-01 RX ADMIN — APIXABAN SCH MG: 5 TABLET, FILM COATED ORAL at 20:58

## 2025-02-01 RX ADMIN — ENOXAPARIN SODIUM SCH MG: 40 INJECTION SUBCUTANEOUS at 17:30

## 2025-02-01 RX ADMIN — ACETAMINOPHEN PRN MG: 325 TABLET ORAL at 23:49

## 2025-02-01 RX ADMIN — Medication SCH MG: at 21:43

## 2025-02-01 RX ADMIN — DEXTROSE MONOHYDRATE ONE MLS/HR: 50 INJECTION, SOLUTION INTRAVENOUS at 11:05

## 2025-02-01 RX ADMIN — LISINOPRIL SCH MG: 10 TABLET ORAL at 20:57

## 2025-02-01 RX ADMIN — DONEPEZIL HYDROCHLORIDE SCH MG: 5 TABLET ORAL at 21:43

## 2025-02-01 RX ADMIN — AZITHROMYCIN DIHYDRATE ONE MG: 250 TABLET, FILM COATED ORAL at 12:04

## 2025-02-02 VITALS — TEMPERATURE: 98.2 F | DIASTOLIC BLOOD PRESSURE: 71 MMHG | OXYGEN SATURATION: 95 % | SYSTOLIC BLOOD PRESSURE: 151 MMHG

## 2025-02-02 VITALS — SYSTOLIC BLOOD PRESSURE: 103 MMHG | DIASTOLIC BLOOD PRESSURE: 45 MMHG | TEMPERATURE: 97.5 F | OXYGEN SATURATION: 97 %

## 2025-02-02 VITALS — TEMPERATURE: 98.1 F | SYSTOLIC BLOOD PRESSURE: 137 MMHG | OXYGEN SATURATION: 98 % | DIASTOLIC BLOOD PRESSURE: 57 MMHG

## 2025-02-02 VITALS — SYSTOLIC BLOOD PRESSURE: 108 MMHG | TEMPERATURE: 97.9 F | DIASTOLIC BLOOD PRESSURE: 56 MMHG | OXYGEN SATURATION: 98 %

## 2025-02-02 VITALS — SYSTOLIC BLOOD PRESSURE: 137 MMHG | DIASTOLIC BLOOD PRESSURE: 57 MMHG | OXYGEN SATURATION: 98 % | TEMPERATURE: 98.1 F

## 2025-02-02 VITALS — DIASTOLIC BLOOD PRESSURE: 70 MMHG | SYSTOLIC BLOOD PRESSURE: 140 MMHG | OXYGEN SATURATION: 98 % | TEMPERATURE: 99 F

## 2025-02-02 LAB
BASOPHILS # BLD AUTO: 0 K/UL (ref 0–0.2)
BASOPHILS NFR BLD AUTO: 0.1 % (ref 0–2)
BUN SERPL-MCNC: 15 MG/DL (ref 7–18)
CALCIUM SERPL-MCNC: 9 MG/DL (ref 8.5–10.1)
CHLORIDE SERPL-SCNC: 103 MMOL/L (ref 98–107)
CO2 SERPL-SCNC: 31 MMOL/L (ref 21–32)
CREAT SERPL-MCNC: 0.7 MG/DL (ref 0.6–1.3)
EOSINOPHIL # BLD AUTO: 0.3 K/UL (ref 0–0.7)
EOSINOPHIL NFR BLD AUTO: 2.4 % (ref 0–6)
ERYTHROCYTE [DISTWIDTH] IN BLOOD BY AUTOMATED COUNT: 13.3 % (ref 11.5–15)
GLUCOSE SERPL-MCNC: 102 MG/DL (ref 74–106)
HCT VFR BLD AUTO: 31 % (ref 39–51)
HGB BLD-MCNC: 10.9 G/DL (ref 13.5–17.5)
LYMPHOCYTES NFR BLD AUTO: 1.1 K/UL (ref 0.8–4.8)
LYMPHOCYTES NFR BLD AUTO: 10.2 % (ref 20–44)
MAGNESIUM SERPL-MCNC: 1.6 MG/DL (ref 1.8–2.4)
MCH RBC QN AUTO: 31 PG (ref 26–33)
MCHC RBC AUTO-ENTMCNC: 35 G/DL (ref 31–36)
MCV RBC AUTO: 87 FL (ref 80–96)
MONOCYTES NFR BLD AUTO: 1.1 K/UL (ref 0.1–1.3)
MONOCYTES NFR BLD AUTO: 10.5 % (ref 2–12)
NEUTROPHILS # BLD AUTO: 8.3 K/UL (ref 1.8–8.9)
NEUTROPHILS NFR BLD AUTO: 76.8 % (ref 43–81)
PHOSPHATE SERPL-MCNC: 2.9 MG/DL (ref 2.5–4.9)
PLATELET # BLD AUTO: 132 K/UL (ref 150–450)
POTASSIUM SERPL-SCNC: 4.1 MMOL/L (ref 3.5–5.1)
RBC # BLD AUTO: 3.55 MIL/UL (ref 4.5–6)
SODIUM SERPL-SCNC: 141 MMOL/L (ref 136–145)
TSH SERPL DL<=0.005 MIU/L-ACNC: 2.26 UIU/ML (ref 0.36–3.74)
WBC NRBC COR # BLD AUTO: 10.9 K/UL (ref 4.3–11)

## 2025-02-02 RX ADMIN — Medication PRN MG: at 20:05

## 2025-02-02 RX ADMIN — SODIUM PHOSPHATE, DIBASIC AND SODIUM PHOSPHATE, MONOBASIC PRN EA: 7; 19 ENEMA RECTAL at 21:49

## 2025-02-02 RX ADMIN — Medication SCH TAB: at 08:58

## 2025-02-02 RX ADMIN — ALUMINUM HYDROXIDE, MAGNESIUM HYDROXIDE, AND SIMETHICONE SCH ML: 200; 200; 20 SUSPENSION ORAL at 08:57

## 2025-02-02 RX ADMIN — PANTOPRAZOLE SODIUM SCH MG: 40 TABLET, DELAYED RELEASE ORAL at 08:59

## 2025-02-02 RX ADMIN — DEXTROSE MONOHYDRATE SCH MLS/HR: 50 INJECTION, SOLUTION INTRAVENOUS at 11:39

## 2025-02-02 RX ADMIN — MAGNESIUM OXIDE TAB 400 MG (241.3 MG ELEMENTAL MG) SCH MG: 400 (241.3 MG) TAB at 23:08

## 2025-02-02 RX ADMIN — Medication SCH MG: at 08:59

## 2025-02-02 RX ADMIN — DEXTROSE MONOHYDRATE SCH MLS/HR: 50 INJECTION, SOLUTION INTRAVENOUS at 11:03

## 2025-02-02 RX ADMIN — OXYCODONE HYDROCHLORIDE AND ACETAMINOPHEN SCH MG: 500 TABLET ORAL at 08:58

## 2025-02-02 RX ADMIN — Medication SCH MG: at 08:58

## 2025-02-02 RX ADMIN — MAGNESIUM OXIDE TAB 400 MG (241.3 MG ELEMENTAL MG) ONE MG: 400 (241.3 MG) TAB at 11:01

## 2025-02-02 RX ADMIN — POLYETHYLENE GLYCOL 3350 SCH GM: 17 POWDER, FOR SOLUTION ORAL at 08:57

## 2025-02-02 RX ADMIN — METFORMIN HYDROCHLORIDE SCH MG: 500 TABLET, FILM COATED ORAL at 08:58

## 2025-02-03 VITALS — SYSTOLIC BLOOD PRESSURE: 168 MMHG | OXYGEN SATURATION: 98 % | TEMPERATURE: 98.4 F | DIASTOLIC BLOOD PRESSURE: 68 MMHG

## 2025-02-03 VITALS — OXYGEN SATURATION: 97 % | DIASTOLIC BLOOD PRESSURE: 54 MMHG | TEMPERATURE: 98.4 F | SYSTOLIC BLOOD PRESSURE: 121 MMHG

## 2025-02-03 VITALS — TEMPERATURE: 98.2 F | OXYGEN SATURATION: 97 % | DIASTOLIC BLOOD PRESSURE: 71 MMHG | SYSTOLIC BLOOD PRESSURE: 173 MMHG

## 2025-02-03 LAB
BASOPHILS # BLD AUTO: 0 K/UL (ref 0–0.2)
BASOPHILS NFR BLD AUTO: 0.3 % (ref 0–2)
BUN SERPL-MCNC: 16 MG/DL (ref 7–18)
CALCIUM SERPL-MCNC: 9.6 MG/DL (ref 8.5–10.1)
CHLORIDE SERPL-SCNC: 102 MMOL/L (ref 98–107)
CO2 SERPL-SCNC: 33 MMOL/L (ref 21–32)
CREAT SERPL-MCNC: 0.9 MG/DL (ref 0.6–1.3)
EOSINOPHIL # BLD AUTO: 0.2 K/UL (ref 0–0.7)
EOSINOPHIL NFR BLD AUTO: 2.7 % (ref 0–6)
EOSINOPHIL NFR BLD MANUAL: 2 % (ref 0–4)
ERYTHROCYTE [DISTWIDTH] IN BLOOD BY AUTOMATED COUNT: 13.3 % (ref 11.5–15)
GLUCOSE SERPL-MCNC: 91 MG/DL (ref 74–106)
HCT VFR BLD AUTO: 31 % (ref 39–51)
HGB BLD-MCNC: 10.9 G/DL (ref 13.5–17.5)
LYMPHOCYTES NFR BLD AUTO: 1.2 K/UL (ref 0.8–4.8)
LYMPHOCYTES NFR BLD AUTO: 13.3 % (ref 20–44)
LYMPHOCYTES NFR BLD MANUAL: 11 % (ref 16–48)
MAGNESIUM SERPL-MCNC: 1.5 MG/DL (ref 1.8–2.4)
MCH RBC QN AUTO: 31 PG (ref 26–33)
MCHC RBC AUTO-ENTMCNC: 35 G/DL (ref 31–36)
MCV RBC AUTO: 86 FL (ref 80–96)
MONOCYTES NFR BLD AUTO: 1.3 K/UL (ref 0.1–1.3)
MONOCYTES NFR BLD AUTO: 14.1 % (ref 2–12)
MONOCYTES NFR BLD MANUAL: 15 % (ref 0–11)
MYELOCYTES NFR BLD MANUAL: 1 % (ref 0–0)
NEUTROPHILS # BLD AUTO: 6.3 K/UL (ref 1.8–8.9)
NEUTROPHILS NFR BLD AUTO: 69.6 % (ref 43–81)
NEUTS SEG NFR BLD MANUAL: 71 % (ref 42–76)
PHOSPHATE SERPL-MCNC: 3 MG/DL (ref 2.5–4.9)
PLATELET # BLD AUTO: 144 K/UL (ref 150–450)
PLATELET BLD QL SMEAR: (no result)
POTASSIUM SERPL-SCNC: 4.3 MMOL/L (ref 3.5–5.1)
RBC # BLD AUTO: 3.57 MIL/UL (ref 4.5–6)
SODIUM SERPL-SCNC: 139 MMOL/L (ref 136–145)
WBC NRBC COR # BLD AUTO: 9 K/UL (ref 4.3–11)

## 2025-02-03 RX ADMIN — CEFEPIME SCH MLS/HR: 2 INJECTION, POWDER, FOR SOLUTION INTRAVENOUS at 21:15

## 2025-02-03 RX ADMIN — CALCIUM CARBONATE-CHOLECALCIFEROL TAB 250 MG-125 UNIT SCH UDTAB: 250-125 TAB at 09:17

## 2025-02-03 RX ADMIN — MAGNESIUM OXIDE TAB 400 MG (241.3 MG ELEMENTAL MG) ONE MG: 400 (241.3 MG) TAB at 11:36

## 2025-02-04 VITALS — OXYGEN SATURATION: 97 % | DIASTOLIC BLOOD PRESSURE: 75 MMHG | TEMPERATURE: 98.6 F | SYSTOLIC BLOOD PRESSURE: 180 MMHG

## 2025-02-04 VITALS — SYSTOLIC BLOOD PRESSURE: 160 MMHG | DIASTOLIC BLOOD PRESSURE: 70 MMHG | TEMPERATURE: 97.9 F | OXYGEN SATURATION: 97 %

## 2025-02-04 VITALS — SYSTOLIC BLOOD PRESSURE: 109 MMHG | OXYGEN SATURATION: 97 % | TEMPERATURE: 97.9 F | DIASTOLIC BLOOD PRESSURE: 56 MMHG

## 2025-02-04 LAB
BASOPHILS # BLD AUTO: 0 K/UL (ref 0–0.2)
BASOPHILS NFR BLD AUTO: 0.2 % (ref 0–2)
BUN SERPL-MCNC: 13 MG/DL (ref 7–18)
CALCIUM SERPL-MCNC: 9.2 MG/DL (ref 8.5–10.1)
CHLORIDE SERPL-SCNC: 101 MMOL/L (ref 98–107)
CO2 SERPL-SCNC: 32 MMOL/L (ref 21–32)
CREAT SERPL-MCNC: 0.7 MG/DL (ref 0.6–1.3)
EOSINOPHIL # BLD AUTO: 0.2 K/UL (ref 0–0.7)
EOSINOPHIL NFR BLD AUTO: 1.9 % (ref 0–6)
EOSINOPHIL NFR BLD MANUAL: 2 % (ref 0–4)
ERYTHROCYTE [DISTWIDTH] IN BLOOD BY AUTOMATED COUNT: 13.2 % (ref 11.5–15)
GLUCOSE SERPL-MCNC: 120 MG/DL (ref 74–106)
HCT VFR BLD AUTO: 31 % (ref 39–51)
HGB BLD-MCNC: 10.8 G/DL (ref 13.5–17.5)
LYMPHOCYTES NFR BLD AUTO: 1.2 K/UL (ref 0.8–4.8)
LYMPHOCYTES NFR BLD AUTO: 13.7 % (ref 20–44)
LYMPHOCYTES NFR BLD MANUAL: 12 % (ref 16–48)
MAGNESIUM SERPL-MCNC: 1.4 MG/DL (ref 1.8–2.4)
MCH RBC QN AUTO: 30 PG (ref 26–33)
MCHC RBC AUTO-ENTMCNC: 35 G/DL (ref 31–36)
MCV RBC AUTO: 86 FL (ref 80–96)
MONOCYTES NFR BLD AUTO: 1.3 K/UL (ref 0.1–1.3)
MONOCYTES NFR BLD AUTO: 14.1 % (ref 2–12)
MONOCYTES NFR BLD MANUAL: 15 % (ref 0–11)
NEUTROPHILS # BLD AUTO: 6.2 K/UL (ref 1.8–8.9)
NEUTROPHILS NFR BLD AUTO: 70.1 % (ref 43–81)
NEUTS SEG NFR BLD MANUAL: 71 % (ref 42–76)
PHOSPHATE SERPL-MCNC: 3 MG/DL (ref 2.5–4.9)
PLATELET # BLD AUTO: 154 K/UL (ref 150–450)
PLATELET BLD QL SMEAR: ADEQUATE
POTASSIUM SERPL-SCNC: 4 MMOL/L (ref 3.5–5.1)
RBC # BLD AUTO: 3.56 MIL/UL (ref 4.5–6)
SODIUM SERPL-SCNC: 137 MMOL/L (ref 136–145)
WBC NRBC COR # BLD AUTO: 8.9 K/UL (ref 4.3–11)

## 2025-02-04 RX ADMIN — LINAGLIPTIN SCH MG: 5 TABLET, FILM COATED ORAL at 09:26

## 2025-02-04 RX ADMIN — CEFEPIME SCH MLS/HR: 2 INJECTION, POWDER, FOR SOLUTION INTRAVENOUS at 16:38

## 2025-02-04 RX ADMIN — Medication SCH EACH: at 17:45

## 2025-02-04 RX ADMIN — GUAIFENESIN AND DEXTROMETHORPHAN PRN ML: 100; 10 SYRUP ORAL at 00:14

## 2025-02-04 RX ADMIN — AZITHROMYCIN DIHYDRATE SCH MG: 250 TABLET, FILM COATED ORAL at 12:07

## 2025-02-04 RX ADMIN — MAGNESIUM OXIDE TAB 400 MG (241.3 MG ELEMENTAL MG) SCH MG: 400 (241.3 MG) TAB at 11:37

## 2025-02-04 RX ADMIN — INSULIN HUMAN PRN UNITS: 100 INJECTION, SOLUTION PARENTERAL at 17:47

## 2025-02-05 VITALS — OXYGEN SATURATION: 96 % | TEMPERATURE: 97.8 F | SYSTOLIC BLOOD PRESSURE: 118 MMHG | DIASTOLIC BLOOD PRESSURE: 52 MMHG

## 2025-02-05 VITALS — DIASTOLIC BLOOD PRESSURE: 52 MMHG | SYSTOLIC BLOOD PRESSURE: 118 MMHG

## 2025-02-05 LAB
BASOPHILS # BLD AUTO: 0 K/UL (ref 0–0.2)
BASOPHILS NFR BLD AUTO: 0.1 % (ref 0–2)
BUN SERPL-MCNC: 17 MG/DL (ref 7–18)
CALCIUM SERPL-MCNC: 9.6 MG/DL (ref 8.5–10.1)
CHLORIDE SERPL-SCNC: 102 MMOL/L (ref 98–107)
CO2 SERPL-SCNC: 28 MMOL/L (ref 21–32)
CREAT SERPL-MCNC: 0.7 MG/DL (ref 0.6–1.3)
EOSINOPHIL # BLD AUTO: 0.2 K/UL (ref 0–0.7)
EOSINOPHIL NFR BLD AUTO: 1.9 % (ref 0–6)
EOSINOPHIL NFR BLD MANUAL: 2 % (ref 0–4)
ERYTHROCYTE [DISTWIDTH] IN BLOOD BY AUTOMATED COUNT: 13.2 % (ref 11.5–15)
GLUCOSE SERPL-MCNC: 120 MG/DL (ref 74–106)
HCT VFR BLD AUTO: 33 % (ref 39–51)
HGB BLD-MCNC: 11.2 G/DL (ref 13.5–17.5)
LYMPHOCYTES NFR BLD AUTO: 1.2 K/UL (ref 0.8–4.8)
LYMPHOCYTES NFR BLD AUTO: 11.6 % (ref 20–44)
LYMPHOCYTES NFR BLD MANUAL: 10 % (ref 16–48)
MAGNESIUM SERPL-MCNC: 1.7 MG/DL (ref 1.8–2.4)
MCH RBC QN AUTO: 30 PG (ref 26–33)
MCHC RBC AUTO-ENTMCNC: 34 G/DL (ref 31–36)
MCV RBC AUTO: 87 FL (ref 80–96)
MONOCYTES NFR BLD AUTO: 1.4 K/UL (ref 0.1–1.3)
MONOCYTES NFR BLD AUTO: 12.7 % (ref 2–12)
MONOCYTES NFR BLD MANUAL: 11 % (ref 0–11)
MYELOCYTES NFR BLD MANUAL: 2 % (ref 0–0)
NEUTROPHILS # BLD AUTO: 7.9 K/UL (ref 1.8–8.9)
NEUTROPHILS NFR BLD AUTO: 73.7 % (ref 43–81)
NEUTS SEG NFR BLD MANUAL: 75 % (ref 42–76)
PHOSPHATE SERPL-MCNC: 3 MG/DL (ref 2.5–4.9)
PLATELET # BLD AUTO: 181 K/UL (ref 150–450)
PLATELET BLD QL SMEAR: ADEQUATE
POTASSIUM SERPL-SCNC: 4.9 MMOL/L (ref 3.5–5.1)
RBC # BLD AUTO: 3.74 MIL/UL (ref 4.5–6)
SODIUM SERPL-SCNC: 137 MMOL/L (ref 136–145)
WBC NRBC COR # BLD AUTO: 10.7 K/UL (ref 4.3–11)